# Patient Record
Sex: FEMALE | Race: WHITE | URBAN - METROPOLITAN AREA
[De-identification: names, ages, dates, MRNs, and addresses within clinical notes are randomized per-mention and may not be internally consistent; named-entity substitution may affect disease eponyms.]

---

## 2019-01-23 ENCOUNTER — APPOINTMENT (RX ONLY)
Dept: URBAN - METROPOLITAN AREA CLINIC 146 | Facility: CLINIC | Age: 54
Setting detail: DERMATOLOGY
End: 2019-01-23

## 2019-01-23 DIAGNOSIS — L12.0 BULLOUS PEMPHIGOID: ICD-10-CM

## 2019-01-23 PROBLEM — I10 ESSENTIAL (PRIMARY) HYPERTENSION: Status: ACTIVE | Noted: 2019-01-23

## 2019-01-23 PROBLEM — M12.9 ARTHROPATHY, UNSPECIFIED: Status: ACTIVE | Noted: 2019-01-23

## 2019-01-23 PROBLEM — L29.8 OTHER PRURITUS: Status: ACTIVE | Noted: 2019-01-23

## 2019-01-23 PROBLEM — K21.9 GASTRO-ESOPHAGEAL REFLUX DISEASE WITHOUT ESOPHAGITIS: Status: ACTIVE | Noted: 2019-01-23

## 2019-01-23 PROBLEM — M32.10 SYSTEMIC LUPUS ERYTHEMATOSUS, ORGAN OR SYSTEM INVOLVEMENT UNSPECIFIED: Status: ACTIVE | Noted: 2019-01-23

## 2019-01-23 PROCEDURE — 99213 OFFICE O/P EST LOW 20 MIN: CPT

## 2019-01-23 PROCEDURE — ? PRESCRIPTION

## 2019-01-23 PROCEDURE — ? COUNSELING

## 2019-01-23 RX ORDER — DAPSONE 100 MG/1
TABLET ORAL
Qty: 30 | Refills: 5 | Status: ERX | COMMUNITY
Start: 2019-01-23

## 2019-01-23 RX ORDER — TRIAMCINOLONE ACETONIDE 0.25 MG/G
CREAM TOPICAL
Qty: 1 | Refills: 0 | Status: ERX | COMMUNITY
Start: 2019-01-23

## 2019-01-23 RX ADMIN — DAPSONE: 100 TABLET ORAL at 00:00

## 2019-01-23 RX ADMIN — TRIAMCINOLONE ACETONIDE: 0.25 CREAM TOPICAL at 00:00

## 2019-01-23 ASSESSMENT — LOCATION DETAILED DESCRIPTION DERM
LOCATION DETAILED: RIGHT PROXIMAL PRETIBIAL REGION
LOCATION DETAILED: LEFT PROXIMAL PRETIBIAL REGION

## 2019-01-23 ASSESSMENT — LOCATION SIMPLE DESCRIPTION DERM
LOCATION SIMPLE: RIGHT PRETIBIAL REGION
LOCATION SIMPLE: LEFT PRETIBIAL REGION

## 2019-01-23 ASSESSMENT — LOCATION ZONE DERM: LOCATION ZONE: LEG

## 2019-06-10 ENCOUNTER — RX ONLY (OUTPATIENT)
Age: 54
Setting detail: RX ONLY
End: 2019-06-10

## 2019-06-10 ENCOUNTER — APPOINTMENT (RX ONLY)
Dept: URBAN - METROPOLITAN AREA CLINIC 146 | Facility: CLINIC | Age: 54
Setting detail: DERMATOLOGY
End: 2019-06-10

## 2019-06-10 DIAGNOSIS — L12.0 BULLOUS PEMPHIGOID: ICD-10-CM

## 2019-06-10 PROCEDURE — ? COUNSELING

## 2019-06-10 PROCEDURE — ? ORDER TESTS

## 2019-06-10 PROCEDURE — ? INVENTORY

## 2019-06-10 PROCEDURE — 99213 OFFICE O/P EST LOW 20 MIN: CPT

## 2019-06-10 RX ORDER — DAPSONE 100 MG/1
TABLET ORAL
Qty: 30 | Refills: 5 | Status: ERX

## 2019-06-10 RX ORDER — TRIAMCINOLONE ACETONIDE 0.25 MG/G
CREAM TOPICAL
Qty: 1 | Refills: 3 | Status: ERX

## 2019-12-11 ENCOUNTER — RX ONLY (OUTPATIENT)
Age: 54
Setting detail: RX ONLY
End: 2019-12-11

## 2019-12-11 ENCOUNTER — APPOINTMENT (RX ONLY)
Dept: URBAN - METROPOLITAN AREA CLINIC 146 | Facility: CLINIC | Age: 54
Setting detail: DERMATOLOGY
End: 2019-12-11

## 2019-12-11 DIAGNOSIS — L12.0 BULLOUS PEMPHIGOID: ICD-10-CM

## 2019-12-11 PROCEDURE — 99213 OFFICE O/P EST LOW 20 MIN: CPT

## 2019-12-11 PROCEDURE — ? FULL BODY SKIN EXAM - DECLINED

## 2019-12-11 PROCEDURE — ? TREATMENT REGIMEN

## 2019-12-11 PROCEDURE — ? COUNSELING

## 2019-12-11 RX ORDER — TRIAMCINOLONE ACETONIDE 0.25 MG/G
CREAM TOPICAL
Qty: 1 | Refills: 3 | Status: ERX

## 2019-12-11 NOTE — PROCEDURE: TREATMENT REGIMEN
Detail Level: Zone
Discontinue Regimen: dapsone 100 mg tablet
Continue Regimen: Using trismcinolone cream

## 2020-06-18 ENCOUNTER — APPOINTMENT (RX ONLY)
Dept: URBAN - METROPOLITAN AREA CLINIC 146 | Facility: CLINIC | Age: 55
Setting detail: DERMATOLOGY
End: 2020-06-18

## 2020-06-18 VITALS — TEMPERATURE: 98.06 F

## 2020-06-18 DIAGNOSIS — L12.0 BULLOUS PEMPHIGOID: ICD-10-CM | Status: WELL CONTROLLED

## 2020-06-18 PROCEDURE — ? FULL BODY SKIN EXAM - DECLINED

## 2020-06-18 PROCEDURE — ? PRESCRIPTION

## 2020-06-18 PROCEDURE — ? ADDITIONAL NOTES

## 2020-06-18 PROCEDURE — ? COUNSELING

## 2020-06-18 PROCEDURE — ? TREATMENT REGIMEN

## 2020-06-18 PROCEDURE — 99213 OFFICE O/P EST LOW 20 MIN: CPT

## 2020-06-18 RX ORDER — TRIAMCINOLONE ACETONIDE 1 MG/G
CREAM TOPICAL
Qty: 1 | Refills: 2 | Status: ERX | COMMUNITY
Start: 2020-06-18

## 2020-06-18 RX ADMIN — TRIAMCINOLONE ACETONIDE: 1 CREAM TOPICAL at 00:00

## 2020-06-18 ASSESSMENT — LOCATION DETAILED DESCRIPTION DERM
LOCATION DETAILED: LEFT PROXIMAL PRETIBIAL REGION
LOCATION DETAILED: RIGHT PROXIMAL PRETIBIAL REGION

## 2020-06-18 ASSESSMENT — LOCATION SIMPLE DESCRIPTION DERM
LOCATION SIMPLE: RIGHT PRETIBIAL REGION
LOCATION SIMPLE: LEFT PRETIBIAL REGION

## 2020-06-18 ASSESSMENT — LOCATION ZONE DERM: LOCATION ZONE: LEG

## 2020-06-18 NOTE — PROCEDURE: TREATMENT REGIMEN
Discontinue Regimen: dapsone 100 mg tablet
Continue Regimen: Using trismcinolone cream
Detail Level: Zone

## 2021-01-13 ENCOUNTER — APPOINTMENT (RX ONLY)
Dept: URBAN - METROPOLITAN AREA CLINIC 146 | Facility: CLINIC | Age: 56
Setting detail: DERMATOLOGY
End: 2021-01-13

## 2021-01-13 VITALS — TEMPERATURE: 96.8 F

## 2021-01-13 DIAGNOSIS — L12.0 BULLOUS PEMPHIGOID: ICD-10-CM | Status: WELL CONTROLLED

## 2021-01-13 PROCEDURE — ? ADDITIONAL NOTES

## 2021-01-13 PROCEDURE — ? FULL BODY SKIN EXAM - DECLINED

## 2021-01-13 PROCEDURE — ? COUNSELING

## 2021-01-13 PROCEDURE — 99213 OFFICE O/P EST LOW 20 MIN: CPT

## 2021-01-13 PROCEDURE — ? MEDICATION COUNSELING

## 2021-01-13 PROCEDURE — ? TREATMENT REGIMEN

## 2021-01-13 NOTE — PROCEDURE: MEDICATION COUNSELING
Xelafshanz Pregnancy And Lactation Text: This medication is Pregnancy Category D and is not considered safe during pregnancy.  The risk during breast feeding is also uncertain.

## 2021-01-13 NOTE — PROCEDURE: MEDICATION COUNSELING
The patient is a 6m3w Male complaining of Hydroxychloroquine Pregnancy And Lactation Text: This medication has been shown to cause fetal harm but it isn't assigned a Pregnancy Risk Category. There are small amounts excreted in breast milk.

## 2021-01-13 NOTE — PROCEDURE: MEDICATION COUNSELING
<-- Click to add NO significant Past Surgical History Tetracycline Counseling: Patient counseled regarding possible photosensitivity and increased risk for sunburn.  Patient instructed to avoid sunlight, if possible.  When exposed to sunlight, patients should wear protective clothing, sunglasses, and sunscreen.  The patient was instructed to call the office immediately if the following severe adverse effects occur:  hearing changes, easy bruising/bleeding, severe headache, or vision changes.  The patient verbalized understanding of the proper use and possible adverse effects of tetracycline.  All of the patient's questions and concerns were addressed. Patient understands to avoid pregnancy while on therapy due to potential birth defects.

## 2021-08-31 ENCOUNTER — APPOINTMENT (RX ONLY)
Dept: URBAN - METROPOLITAN AREA CLINIC 146 | Facility: CLINIC | Age: 56
Setting detail: DERMATOLOGY
End: 2021-08-31

## 2021-08-31 DIAGNOSIS — D22 MELANOCYTIC NEVI: ICD-10-CM

## 2021-08-31 DIAGNOSIS — B07.8 OTHER VIRAL WARTS: ICD-10-CM

## 2021-08-31 PROBLEM — D22.39 MELANOCYTIC NEVI OF OTHER PARTS OF FACE: Status: ACTIVE | Noted: 2021-08-31

## 2021-08-31 PROCEDURE — ? FULL BODY SKIN EXAM

## 2021-08-31 PROCEDURE — ? TREATMENT REGIMEN

## 2021-08-31 PROCEDURE — ? COUNSELING

## 2021-08-31 PROCEDURE — ? ADDITIONAL NOTES

## 2021-08-31 PROCEDURE — 99213 OFFICE O/P EST LOW 20 MIN: CPT

## 2021-08-31 ASSESSMENT — LOCATION DETAILED DESCRIPTION DERM
LOCATION DETAILED: LEFT LATERAL PLANTAR HEEL
LOCATION DETAILED: RIGHT MEDIAL FOREHEAD

## 2021-08-31 ASSESSMENT — LOCATION SIMPLE DESCRIPTION DERM
LOCATION SIMPLE: RIGHT FOREHEAD
LOCATION SIMPLE: LEFT PLANTAR SURFACE

## 2021-08-31 ASSESSMENT — LOCATION ZONE DERM
LOCATION ZONE: FACE
LOCATION ZONE: FEET